# Patient Record
Sex: MALE | Race: WHITE | URBAN - METROPOLITAN AREA
[De-identification: names, ages, dates, MRNs, and addresses within clinical notes are randomized per-mention and may not be internally consistent; named-entity substitution may affect disease eponyms.]

---

## 2019-03-18 ENCOUNTER — HISTORICAL (OUTPATIENT)
Dept: ADMINISTRATIVE | Facility: HOSPITAL | Age: 64
End: 2019-03-18

## 2019-03-18 LAB
ABS NEUT (OLG): 4.5 X10(3)/MCL (ref 2.1–9.2)
ALBUMIN SERPL-MCNC: 4.8 GM/DL (ref 3.4–5)
ALBUMIN/GLOB SERPL: 2.29 {RATIO} (ref 1.5–2.5)
ALP SERPL-CCNC: 44 UNIT/L (ref 38–126)
ALT SERPL-CCNC: 31 UNIT/L (ref 7–52)
APPEARANCE, UA: CLEAR
AST SERPL-CCNC: 17 UNIT/L (ref 15–37)
BACTERIA #/AREA URNS AUTO: NORMAL /HPF
BILIRUB SERPL-MCNC: 0.8 MG/DL (ref 0.2–1)
BILIRUB UR QL STRIP: NEGATIVE MG/DL
BILIRUBIN DIRECT+TOT PNL SERPL-MCNC: 0.1 MG/DL (ref 0–0.5)
BILIRUBIN DIRECT+TOT PNL SERPL-MCNC: 0.7 MG/DL
BUN SERPL-MCNC: 18 MG/DL (ref 7–18)
CALCIUM SERPL-MCNC: 8.9 MG/DL (ref 8.5–10)
CHLORIDE SERPL-SCNC: 97 MMOL/L (ref 98–107)
CHOLEST SERPL-MCNC: 244 MG/DL (ref 0–200)
CHOLEST/HDLC SERPL: 7 {RATIO}
CO2 SERPL-SCNC: 28 MMOL/L (ref 21–32)
COLOR UR: YELLOW
CREAT SERPL-MCNC: 1.28 MG/DL (ref 0.6–1.3)
ERYTHROCYTE [DISTWIDTH] IN BLOOD BY AUTOMATED COUNT: 12.2 % (ref 11.5–17)
GLOBULIN SER-MCNC: 2.1 GM/DL (ref 1.2–3)
GLUCOSE (UA): NEGATIVE MG/DL
GLUCOSE SERPL-MCNC: 112 MG/DL (ref 74–106)
HCT VFR BLD AUTO: 44.2 % (ref 42–52)
HDLC SERPL-MCNC: 35 MG/DL (ref 35–60)
HGB BLD-MCNC: 15.5 GM/DL (ref 14–18)
HGB UR QL STRIP: NEGATIVE UNIT/L
KETONES UR QL STRIP: NEGATIVE MG/DL
LDLC SERPL CALC-MCNC: 128 MG/DL (ref 0–129)
LEUKOCYTE ESTERASE UR QL STRIP: NEGATIVE UNIT/L
LYMPHOCYTES # BLD AUTO: 1.2 X10(3)/MCL (ref 0.6–3.4)
LYMPHOCYTES NFR BLD AUTO: 20.1 % (ref 13–40)
MCH RBC QN AUTO: 31.4 PG (ref 27–31.2)
MCHC RBC AUTO-ENTMCNC: 35 GM/DL (ref 32–36)
MCV RBC AUTO: 90 FL (ref 80–94)
MONOCYTES # BLD AUTO: 0.5 X10(3)/MCL (ref 0.1–1.3)
MONOCYTES NFR BLD AUTO: 8.2 % (ref 0.1–24)
NEUTROPHILS NFR BLD AUTO: 71.7 % (ref 47–80)
NITRITE UR QL STRIP.AUTO: NEGATIVE
PH UR STRIP: 5.5 [PH]
PLATELET # BLD AUTO: 182 X10(3)/MCL (ref 130–400)
PMV BLD AUTO: 9 FL (ref 9.4–12.4)
POTASSIUM SERPL-SCNC: 4.1 MMOL/L (ref 3.5–5.1)
PROT SERPL-MCNC: 6.9 GM/DL (ref 6.4–8.2)
PROT UR QL STRIP: NEGATIVE MG/DL
PSA SERPL-MCNC: 0.45 NG/ML (ref 0–4.5)
RBC # BLD AUTO: 4.94 X10(6)/MCL (ref 4.7–6.1)
RBC #/AREA URNS HPF: NORMAL /HPF
SODIUM SERPL-SCNC: 133 MMOL/L (ref 136–145)
SP GR UR STRIP: 1.01
SQUAMOUS EPITHELIAL, UA: NORMAL /LPF
TRIGL SERPL-MCNC: 349 MG/DL (ref 30–150)
UROBILINOGEN UR STRIP-ACNC: 0.2 MG/DL
VLDLC SERPL CALC-MCNC: 69.8 MG/DL
WBC # SPEC AUTO: 6.2 X10(3)/MCL (ref 4.5–11.5)
WBC #/AREA URNS AUTO: NORMAL /[HPF]

## 2019-03-25 LAB
HEMOCCULT SP1 STL QL: NEGATIVE
HEMOCCULT SP2 STL QL: NEGATIVE

## 2019-12-12 ENCOUNTER — HISTORICAL (OUTPATIENT)
Dept: ADMINISTRATIVE | Facility: HOSPITAL | Age: 64
End: 2019-12-12

## 2019-12-12 LAB
ALBUMIN SERPL-MCNC: 4.9 GM/DL (ref 3.4–5)
ALBUMIN/GLOB SERPL: 1.81 {RATIO} (ref 1.5–2.5)
ALP SERPL-CCNC: 44 UNIT/L (ref 38–126)
ALT SERPL-CCNC: 50 UNIT/L (ref 7–52)
AST SERPL-CCNC: 31 UNIT/L (ref 15–37)
BILIRUB SERPL-MCNC: 0.9 MG/DL (ref 0.2–1)
BILIRUBIN DIRECT+TOT PNL SERPL-MCNC: 0.2 MG/DL (ref 0–0.5)
BILIRUBIN DIRECT+TOT PNL SERPL-MCNC: 0.7 MG/DL
BUN SERPL-MCNC: 16 MG/DL (ref 7–18)
CALCIUM SERPL-MCNC: 9 MG/DL (ref 8.5–10)
CHLORIDE SERPL-SCNC: 98 MMOL/L (ref 98–107)
CO2 SERPL-SCNC: 28 MMOL/L (ref 21–32)
CREAT SERPL-MCNC: 1.5 MG/DL (ref 0.6–1.3)
GLOBULIN SER-MCNC: 2.7 GM/DL (ref 1.2–3)
GLUCOSE SERPL-MCNC: 129 MG/DL (ref 74–106)
POTASSIUM SERPL-SCNC: 4.9 MMOL/L (ref 3.5–5.1)
PROT SERPL-MCNC: 7.6 GM/DL (ref 6.4–8.2)
SODIUM SERPL-SCNC: 134 MMOL/L (ref 136–145)

## 2022-04-10 ENCOUNTER — HISTORICAL (OUTPATIENT)
Dept: ADMINISTRATIVE | Facility: HOSPITAL | Age: 67
End: 2022-04-10

## 2022-04-26 VITALS
SYSTOLIC BLOOD PRESSURE: 108 MMHG | WEIGHT: 235 LBS | DIASTOLIC BLOOD PRESSURE: 63 MMHG | HEIGHT: 68 IN | BODY MASS INDEX: 35.61 KG/M2

## 2022-05-03 NOTE — HISTORICAL [facility] CERNER
This is a historical note converted from Dayana. Formatting and pictures may have been removed.  Please reference Dayana for original formatting and attached multimedia. Chief Complaint  CPX  History of Present Illness  Yearly exam.? Has had hypertension for 5 years. ?Well controlled.? He complains of pain in his left?wrist area-base of thumb?for 2 years.? Indicates the pain?is along the dorsal aspect?of the?wrist thumb?joint and extends up forearm.? Area of tendinitis.? He has?tried?meloxicam without relief.? Will?give flu vaccine.? Fasting for lab.  Review of Systems  HEENT -?needs? eye exam  CHEST? - no SOB  C/V - no CP  GI? - colonoscope - advised cancer screening   - neg  M/S - see HPI  RESTLESS LEG SYNDROME - konopin prn - usually 2 -3 x per month  LAST CPX 3/18/19  Physical Exam  Vitals & Measurements  HR:?80(Peripheral)? BP:?112/74?  HT:?172.72?cm? WT:?104.0?kg? BMI:?34.86?  GEN?64-year-old white male. ?Well-developed. ?Well-nourished. ?No acute distress.? Overweight.  SKIN?clear  PULSE?regular  HEENT?normal  NECK?no bruits  THYROID?normal  CHEST?clear  HEART?regular rate and rhythm without murmur  ABDOMEN?soft nontender no masses  GENITALIA?normal male. ?No hernia.  RECTAL?no masses  PROSTATE?normal  EXTREMITIES?no edema  Left wrist-no swelling or?erythema.? Full range of motion.  X-ray left wrist-normal.? Radiologist interpretation pending.  Assessment/Plan  1.?Wellness examination?Z00.00  Ordered:  CBC w/ Auto Diff, Routine collect, 03/18/19 9:30:00 CDT, Blood, Order for future visit, Stop date 03/18/19 9:30:00 CDT, Lab Collect, Wellness examination, 03/18/19 9:30:00 CDT  Clinic Follow up, *Est. 03/18/20 3:00:00 CDT, PLEASE MAKE THIS A 30 MINUTE PHYSICAL, Order for future visit, Wellness examination, [facility]  Comprehensive Metabolic Panel, Routine collect, 03/18/19 9:30:00 CDT, Blood, Order for future visit, Stop date 03/18/19 9:30:00 CDT, Lab Collect, Wellness examination, 03/18/19 9:30:00 CDT  Lab  Collection Request, 03/18/19 9:30:00 CDT, [facility] - [facility], 03/18/19 9:30:00 CDT  Lipid Panel, Routine collect, 03/18/19 9:30:00 CDT, Blood, Order for future visit, Stop date 03/18/19 9:30:00 CDT, Lab Collect, Wellness examination, 03/18/19 9:30:00 CDT  Occult Blood Stool #2 Screening, Routine collect, 03/18/19 9:30:00 CDT, Stool, Order for future visit, Stop date 03/18/19 9:30:00 CDT, Nurse collect, Wellness examination, 03/18/19 9:30:00 CDT  Occult Blood Stool #3 Screening, Routine collect, 03/18/19 9:30:00 CDT, Stool, Order for future visit, Stop date 03/18/19 9:30:00 CDT, Nurse collect, Wellness examination, 03/18/19 9:30:00 CDT  Occult Blood Stool Screening Test, Routine collect, 03/18/19 9:30:00 CDT, Stool, Order for future visit, Stop date 03/18/19 9:30:00 CDT, Nurse collect, Wellness examination, 03/18/19 9:30:00 CDT  Preventative Health Care New 40-64 years [id number] PC, Wellness examination  Hypertension  Tendonitis of wrist, left  Restless leg syndrome  Immunization due, [facility], 03/18/19 9:30:00 CDT  Prostate Specific Antigen, Routine collect, 03/18/19 9:30:00 CDT, Blood, Order for future visit, Stop date 03/18/19 9:30:00 CDT, Lab Collect, Wellness examination, 03/18/19 9:30:00 CDT  Urinalysis Complete no reflex, Routine collect, Urine, Order for future visit, 03/18/19 9:30:00 CDT, Stop date 03/18/19 9:30:00 CDT, Nurse collect, Wellness examination  Restless leg syndrome  ?  2.?Hypertension?I10  Ordered:  Preventative Health Care New 40-64 years [id number] PC, Wellness examination  Hypertension  Tendonitis of wrist, left  Restless leg syndrome  Immunization due, [facility], 03/18/19 9:30:00 CDT  ?  3.?Tendonitis of wrist, left?M77.8  ?Refer to Ortho.  Ordered:  External Referral, tendonitis left wrist, ortho, Dr. Reuben Tellez, send office note & X-ray, 03/18/19 9:30:00 CDT, Tendonitis of wrist, left  [facility] New 40-64 years [id number] PC, Wellness examination  Hypertension   Tendonitis of wrist, left  Restless leg syndrome  Immunization due, [facility] - [facility], 03/18/19 9:30:00 CDT  ?  4.?Restless leg syndrome?G25.81  Ordered:  Lab Collection Request, 03/18/19 9:30:00 CDT, [facility] - [facility], 03/18/19 9:30:00 CDT  Preventative Health Care New 40-64 years [id number] PC, Wellness examination  Hypertension  Tendonitis of wrist, left  Restless leg syndrome  Immunization due, [facility], 03/18/19 9:30:00 CDT  Urinalysis Complete no reflex, Routine collect, Urine, Order for future visit, 03/18/19 9:30:00 CDT, Stop date 03/18/19 9:30:00 CDT, Nurse collect, Wellness examination  Restless leg syndrome  ?  5.?Immunization due?Z23  Ordered:  Influenza Virus Vaccine, Inactivated, 0.5 mL, IM, Once-Unscheduled, first dose 03/18/19 9:31:00 CDT  Preventative Health Care New 40-64 years [id number] PC, Wellness examination  Hypertension  Tendonitis of wrist, left  Restless leg syndrome  Immunization due, [facility], 03/18/19 9:30:00 CDT  ?  Orders:  XR Hand Left 2 Views, Routine, 03/18/19 8:54:00 CDT, Pain, lt hand pain, None, Ambulatory, Rad Type, Pain in left hand, [facility], 03/18/19 8:54:00 CDT   Problem List/Past Medical History  Ongoing  Hypertension  Obesity  Restless leg syndrome  Historical  No qualifying data  Procedure/Surgical History  Cholecystectomy   Medications  Adult Quadrivalent Influenza Vaccine, 0.5 mL, IM, Once-Unscheduled  hydrochlorothiazide-lisinopril 25 mg-20 mg oral tablet, 1 tab(s), Oral, Daily  KlonoPIN 0.5 mg oral tablet, See Instructions  Allergies  No Known Medication Allergies  Social History  Alcohol  Current, Beer, 3-5 times per week, 03/18/2019  Employment/School  Employed, Work/School description: ., 03/18/2019  Home/Environment  Lives with Spouse., 03/18/2019  Tobacco  Cigars or pipes daily within last 30 days, Oral, No, 03/18/2019  Health Maintenance  Health Maintenance  ???Pending?(in the next year)  ??? ??Due?  ??? ? ? ?ADL  Screening due??03/18/19??and every 1??year(s)  ??? ? ? ?Alcohol Misuse Screening due??03/18/19??and every 1??year(s)  ??? ? ? ?Aspirin Therapy for CVD Prevention due??03/18/19??and every 1??year(s)  ??? ? ? ?Hypertension Management-Education due??03/18/19??and every 1??year(s)  ??? ? ? ?Hypertension Maintenance-Medication Prescribed due??03/18/19??and every 1??year(s)  ??? ? ? ?Smoking Cessation due??03/18/19??Variable frequency  ??? ? ? ?Tetanus Vaccine due??03/18/19??and every 10??year(s)  ??? ? ? ?Zoster Vaccine due??03/18/19??and every 100??year(s)  ???Satisfied?(in the past 1 year)  ??? ??Satisfied?  ??? ? ? ?Blood Pressure Screening on??03/18/19.??Satisfied by Sneha Barajas LPN  ??? ? ? ?Body Mass Index Check on??03/18/19.??Satisfied by Sneha Barajas LPN  ??? ? ? ?Hypertension Management-Blood Pressure on??03/18/19.??Satisfied by Sneha Barajas LPN  ??? ? ? ?Influenza Vaccine on??03/18/19.??Satisfied by Luis M Adamson MD  ??? ? ? ?Obesity Screening on??03/18/19.??Satisfied by Sneha Barajas LPN  ?  ?      3/18/19 LAB:? CBC, U/A, PSA -NORMAL.? CMP -glucose-112.? T. Chol-244.? LDL-128.? Trig-349.? Recommend low carb diet and weight loss.? Repeat:? FBS, A1c, Lipids fasting 2 months.? If no better Rx statin.   3/25/19 FOB X 3:? NEG, NEG,NEG.

## 2022-05-03 NOTE — HISTORICAL [facility] CERNER
This is a historical note converted from Dayana. Formatting and pictures may have been removed.  Please reference Dayana for original formatting and attached multimedia. Chief Complaint  CPX  History of Present Illness  ?  65 y/o male with HTN HLD?here for check up.  needs refills.  takes clonazepam 2mg for sleep prn.  reports some weight gain, is more sedentary with work  did train horses, now driving a truck, sedentary  ?   Dr. Adamson hx:  HEENT -?needs? eye exam  CHEST? - no SOB  C/V - no CP  GI? - colonoscope - advised cancer screening   - neg  M/S - see HPI  RESTLESS LEG SYNDROME - klonopin prn - usually 2 -3 x per month  LAST CPX 3/18/19  Review of Systems  Constitutional: No fever, No chills, No sweats, No weakness, No fatigue  ?????Eye: No blurring, No double vision.  ?????Ear/Nose/Mouth/Throat: No nasal congestion, No sore throat.  ?????Respiratory: No shortness of breath, No cough, No wheezing, No cyanosis.  ?????Cardiovascular: No chest pain, No palpitations, No bradycardia, No tachycardia, No peripheral edema.  ?????Gastrointestinal: No nausea, No vomiting, No diarrhea, No constipation, No abdominal pain.  ?????Genitourinary: No dysuria, No hematuria.  ?????Musculoskeletal: No back pain, No neck pain, No joint pain, No muscle pain, No claudication.  ?????Integumentary: No rash.  ?????Neurologic: No abnormal balance, No confusion, No numbness, No tingling, No headache.  ?????Psychiatric: No anxiety, No depression.  Physical Exam  Vitals & Measurements  HR:?83(Peripheral)? BP:?122/88?  HT:?172.72?cm? WT:?106.6?kg? BMI:?35.73?  ????General: Alert and oriented, No acute distress.  ?????Eye: Extraocular movements are intact,  ?????HENT: Normocephalic, Oral mucosa is moist.  ???? Respiratory: Respirations are non-labored, Breath sounds are equal, Symmetrical chest wall expansion  ?????Cardiovascular: Normal rate, Regular rhythm, No gallop, Normal peripheral perfusion, No edema.  ??? Musculoskeletal: Normal  range of motion, Normal gait.  ?????Integumentary: Warm, Dry, Intact.  ?????Neurologic: Alert, Oriented, No focal deficits.  ?????Psychiatric: Cooperative, Appropriate mood & affect,  Assessment/Plan  1.?Hypertension?I10  Ordered:  Comprehensive Metabolic Panel, Routine collect, 12/12/19 8:48:00 CST, Blood, Stop date 12/12/19 8:48:00 CST, Lab Collect, Hypertension  HLD (hyperlipidemia), 12/12/19 8:48:00 CST  ?  2.?BMI 35.0-35.9,adult?Z68.35  ?  3.?Arthritis?M19.90  ?  4.?Restless leg syndrome?G25.81  ?  5.?Sleep apnea?G47.30  ?  Orders:  clonazePAM, 2 mg = 1 tab(s), Oral, Once a day (at bedtime), PRN PRN insomnia, # 30 tab(s), 0 Refill(s), Pharmacy: [id number]  hydrochlorothiazide-lisinopril, 1 tab(s), Oral, Daily, # 30 tab(s), 2 Refill(s), Pharmacy: [id number]  pravastatin, 20 mg = 1 tab(s), Oral, Once a day (at bedtime), # 30 tab(s), 2 Refill(s), Pharmacy: [id number]  CMP , add different statin- pravstatin q pm  refill BP medication  low sodium, exercise, weight loss recommended, calorie restriction  discussed controlled substance agreement , signed  reviewed - Klonipin  ?  wellness schedule within 3 months, sooner as needed  flu vaccine given today   Problem List/Past Medical History  Ongoing  Hypertension  Obesity  Restless leg syndrome  Historical  No qualifying data  Procedure/Surgical History  Cholecystectomy   Medications  clonazePAM 2 mg oral tablet, 2 mg= 1 tab(s), Oral, Once a day (at bedtime), PRN  hydrochlorothiazide-lisinopril 25 mg-20 mg oral tablet, 1 tab(s), Oral, Daily, 2 refills  KlonoPIN 0.5 mg oral tablet, 0.5 mg= 1 tab(s), Oral, At Bedtime,? ?Not taking  Pravastatin 20 mg Oral Tab, 20 mg= 1 tab(s), Oral, Once a day (at bedtime), 2 refills  Allergies  No Known Medication Allergies  Social History  Abuse/Neglect  No, 12/12/2019  Alcohol  Current, Beer, 3-5 times per week, 03/18/2019  Employment/School  Employed, Work/School description: .,  03/18/2019  Home/Environment  Lives with Spouse., 03/18/2019  Tobacco  Cigars or pipes daily within last 30 days, Oral, No, 12/12/2019  Cigars or pipes daily within last 30 days, Oral, No, 03/18/2019  Immunizations  Vaccine Date Status   influenza virus vaccine, inactivated 12/12/2019 Given   influenza virus vaccine, inactivated 03/18/2019 Given   Health Maintenance  Health Maintenance  ???Pending?(in the next year)  ??? ??OverDue  ??? ? ? ?Diabetes Screening due??and every?  ??? ? ? ?Influenza Vaccine due??and every?  ??? ??Due?  ??? ? ? ?Depression Screening due??12/12/19??and every?  ??? ? ? ?Tetanus Vaccine due??12/12/19??and every 10??year(s)  ??? ? ? ?Zoster Vaccine due??12/12/19??and every 100??year(s)  ??? ??Due In Future?  ??? ? ? ?Alcohol Misuse Screening not due until??01/01/20??and every 1??year(s)  ??? ? ? ?Obesity Screening not due until??01/01/20??and every 1??year(s)  ??? ? ? ?Hypertension Management-BMP not due until??03/17/20??and every 1??year(s)  ??? ? ? ?Colorectal Screening not due until??03/24/20??and every 1??year(s)  ??? ? ? ?Blood Pressure Screening not due until??12/11/20??and every 1??year(s)  ??? ? ? ?Body Mass Index Check not due until??12/11/20??and every 1??year(s)  ??? ? ? ?Hypertension Management-Blood Pressure not due until??12/11/20??and every 1??year(s)  ???Satisfied?(in the past 1 year)  ??? ??Satisfied?  ??? ? ? ?ADL Screening on??12/12/19.??Satisfied by Mary Martinez MD  ??? ? ? ?Alcohol Misuse Screening on??12/12/19.??Satisfied by Mary Martinez MD  ??? ? ? ?Aspirin Therapy for CVD Prevention on??12/12/19.??Satisfied by Mary Martinez MD  ??? ? ? ?Blood Pressure Screening on??12/12/19.??Satisfied by Sneha Barajas LPN  ??? ? ? ?Body Mass Index Check on??12/12/19.??Satisfied by Sneha Barajas LPN  ??? ? ? ?Colorectal Screening on??03/25/19.??Satisfied by Azael Earl  ??? ? ? ?Diabetes Screening on??03/18/19.??Satisfied by Pau Mcnally  ??? ? ? ?Hypertension  Management-Education on??12/12/19.??Satisfied by Mary Martinez MD  ??? ? ? ?Hypertension Maintenance-Medication Prescribed on??12/12/19.??Satisfied by Mary Martinez MD  ??? ? ? ?Hypertension Management-Blood Pressure on??12/12/19.??Satisfied by Sneha Barajas LPN  ??? ? ? ?Hypertension Management-BMP on??03/18/19.??Satisfied by Pau Mcnally  ??? ? ? ?Influenza Vaccine on??12/12/19.??Satisfied by Sneha Barajas LPN  ??? ? ? ?Lipid Screening on??03/18/19.??Satisfied by Pau Mcnally  ??? ? ? ?Obesity Screening on??12/12/19.??Satisfied by Sneha Barajas LPN  ?